# Patient Record
Sex: FEMALE | Race: WHITE | NOT HISPANIC OR LATINO | Employment: OTHER | URBAN - METROPOLITAN AREA
[De-identification: names, ages, dates, MRNs, and addresses within clinical notes are randomized per-mention and may not be internally consistent; named-entity substitution may affect disease eponyms.]

---

## 2024-01-20 ENCOUNTER — OFFICE VISIT (OUTPATIENT)
Age: 64
End: 2024-01-20
Payer: COMMERCIAL

## 2024-01-20 VITALS
HEART RATE: 69 BPM | DIASTOLIC BLOOD PRESSURE: 60 MMHG | OXYGEN SATURATION: 97 % | HEIGHT: 64 IN | TEMPERATURE: 97 F | RESPIRATION RATE: 18 BRPM | BODY MASS INDEX: 25.27 KG/M2 | WEIGHT: 148 LBS | SYSTOLIC BLOOD PRESSURE: 110 MMHG

## 2024-01-20 DIAGNOSIS — K64.9 HEMORRHOIDS, UNSPECIFIED HEMORRHOID TYPE: Primary | ICD-10-CM

## 2024-01-20 PROCEDURE — 99203 OFFICE O/P NEW LOW 30 MIN: CPT | Performed by: EMERGENCY MEDICINE

## 2024-01-20 RX ORDER — METOPROLOL SUCCINATE 50 MG/1
50 TABLET, EXTENDED RELEASE ORAL DAILY
COMMUNITY
Start: 2023-08-10 | End: 2024-08-09

## 2024-01-20 RX ORDER — CHOLECALCIFEROL (VITAMIN D3) 10 MCG (400 UNIT) TABLET
400
COMMUNITY

## 2024-01-20 RX ORDER — VALSARTAN AND HYDROCHLOROTHIAZIDE 80; 12.5 MG/1; MG/1
1 TABLET, FILM COATED ORAL DAILY
COMMUNITY
Start: 2023-08-10

## 2024-01-20 RX ORDER — HYDROCORTISONE ACETATE 25 MG/1
25 SUPPOSITORY RECTAL 2 TIMES DAILY
Qty: 12 SUPPOSITORY | Refills: 0 | Status: SHIPPED | OUTPATIENT
Start: 2024-01-20

## 2024-01-20 RX ORDER — FLECAINIDE ACETATE 50 MG/1
50 TABLET ORAL 2 TIMES DAILY
COMMUNITY

## 2024-01-20 RX ORDER — ATORVASTATIN CALCIUM 40 MG/1
40 TABLET, FILM COATED ORAL DAILY
COMMUNITY
Start: 2023-08-10

## 2024-01-20 NOTE — PROGRESS NOTES
[facility]        NAME: Naomie Skinner is a 63 y.o. female  : 1960    MRN: [id number]  DATE: 2024  TIME: 2:18 PM    Assessment and Plan   Hemorrhoids, unspecified hemorrhoid type [K64.9]  1. Hemorrhoids, unspecified hemorrhoid type  hydrocortisone (ANUSOL-HC) 25 mg suppository    Ambulatory Referral to Colorectal Surgery            Patient Instructions     Patient Instructions   Hemorrhoids   WHAT YOU NEED TO KNOW:   Hemorrhoids are swollen blood vessels inside your rectum (internal hemorrhoids) or on your anus (external hemorrhoids). Sometimes a hemorrhoid may prolapse. This means it extends out of your anus.  DISCHARGE INSTRUCTIONS:   Return to the emergency department if:   You have severe pain in your rectum or around your anus.    You have severe pain in your abdomen and you are vomiting.     You have bleeding from your anus that soaks through your underwear.    Contact your healthcare provider if:   You have frequent and painful bowel movements.    Your hemorrhoid looks or feels more swollen than usual.     You do not have a bowel movement for 2 days or more.     You see or feel tissue coming through your anus.     You have questions or concerns about your condition or care.    Medicines:  You may  need any of the following:  A pad, cream, or ointment  can help decrease pain, swelling, and itching.     Stool softeners  help treat or prevent constipation.     NSAIDs , such as ibuprofen, help decrease swelling, pain, and fever. NSAIDs can cause stomach bleeding or kidney problems in certain people. If you take blood thinner medicine, always ask your healthcare provider if NSAIDs are safe for you. Always read the medicine label and follow directions.    Take your medicine as directed.  Contact your healthcare provider if you think your medicine is not helping or if you have side effects. Tell your provider if you are allergic to any medicine. Keep a list of the medicines, vitamins,  and herbs you take. Include the amounts, and when and why you take them. Bring the list or the pill bottles to follow-up visits. Carry your medicine list with you in case of an emergency.    Manage your symptoms:   Apply ice on your anus for 15 to 20 minutes every hour or as directed.  Use an ice pack, or put crushed ice in a plastic bag. Cover it with a towel before you apply it to your anus. Ice helps prevent tissue damage and decreases swelling and pain.    Take a sitz bath.  Fill a bathtub with 4 to 6 inches of warm water. You may also use a sitz bath pan that fits inside a toilet bowl. Sit in the sitz bath for 15 minutes. Do this 3 times a day, and after each bowel movement. The warm water can help decrease pain and swelling.     Keep your anal area clean.  Gently wash the area with warm water daily. Soap may irritate the area. After a bowel movement, wipe with moist towelettes or wet toilet paper. Dry toilet paper can irritate the area.    Prevent hemorrhoids:   Do not strain to have a bowel movement.  Do not sit on the toilet too long. These actions can increase pressure on the tissues in your rectum and anus.     Drink plenty of liquids.  Liquids can help prevent constipation. Ask how much liquid to drink each day and which liquids are best for you.     Eat a variety of high-fiber foods.  Examples include fruits, vegetables, and whole grains. Ask your healthcare provider how much fiber you need each day. You may need to take a fiber supplement.         Exercise as directed.  Exercise, such as walking, may make it easier to have a bowel movement. Ask your healthcare provider to help you create an exercise plan.     Do not have anal sex.  Anal sex can weaken the skin around your rectum and anus.     Avoid heavy lifting.  This can cause straining and increase your risk for another hemorrhoid.    Follow up with your doctor as directed:  Write down your questions so you remember to ask them during your visits.  ©  Copyright [employer] 2023 Information is for End User's use only and may not be sold, redistributed or otherwise used for commercial purposes.  The above information is an  only. It is not intended as medical advice for individual conditions or treatments. Talk to your doctor, nurse or pharmacist before following any medical regimen to see if it is safe and effective for you.      Follow up with PCP in 3-5 days.  Proceed to  ER if symptoms worsen.    Chief Complaint     Chief Complaint   Patient presents with   • Hemorrhoids     Patient started to get hemorrhoids and does have a hx of it. Patient did use hydrocrtisona CV 25 Supp.          History of Present Illness       Patient complains of small amount of bright red blood per rectum with bowel movements associated with mild pa.  In since yesterday.  Long history of internal hemorrhoids with identical symptoms.  She has used Anusol HC suppositories in the past with symptomatic relief but she ran out    Review of Systems   Review of Systems   Constitutional:  Negative for chills, fatigue and fever.   HENT:  Negative for congestion, trouble swallowing and voice change.    Respiratory:  Negative for cough, chest tightness, shortness of breath and wheezing.    Cardiovascular:  Negative for chest pain.   Gastrointestinal:  Positive for blood in stool and rectal pain. Negative for abdominal distention, abdominal pain, anal bleeding, diarrhea, nausea and vomiting.   Genitourinary:  Negative for dysuria.   Musculoskeletal:  Negative for neck stiffness.   Skin:  Negative for rash.   Hematological:  Negative for adenopathy.       Current Medications       Current Outpatient Medications:   •  hydrocortisone (ANUSOL-HC) 25 mg suppository, Insert 1 suppository (25 mg total) into the rectum 2 (two) times a day, Disp: 12 suppository, Rfl: 0    Current Allergies     Allergies as of 01/20/2024   • (No Known Allergies)            The following portions of the patient's  "history were reviewed and updated as appropriate: allergies, current medications, past family history, past medical history, past social history, past surgical history and problem list.     No past medical history on file.    Past Surgical History:   Procedure Laterality Date   • EYE SURGERY Right    • REPLACEMENT TOTAL KNEE Right        No family history on file.      Medications have been verified.        Objective   /60   Pulse 69   Temp (!) 97 °F (36.1 °C) (Tympanic)   Resp 18   Ht 5' 4\" (1.626 m)   Wt 67.1 kg (148 lb)   SpO2 97%   BMI 25.40 kg/m²        Physical Exam     Physical Exam  Vitals and nursing note reviewed.   Constitutional:       General: She is not in acute distress.     Appearance: She is well-developed. She is not ill-appearing or toxic-appearing.   HENT:      Head: Normocephalic and atraumatic.      Nose: Mucosal edema present.      Mouth/Throat:      Pharynx: No oropharyngeal exudate or posterior oropharyngeal erythema.      Tonsils: No tonsillar abscesses.   Cardiovascular:      Rate and Rhythm: Normal rate and regular rhythm.   Musculoskeletal:      Cervical back: Neck supple.   Skin:     General: Skin is warm and dry.      Coloration: Skin is not pale.      Findings: No rash.   Neurological:      Mental Status: She is alert and oriented to person, place, and time.   Psychiatric:         Mood and Affect: Mood normal.         Behavior: Behavior normal.         Thought Content: Thought content normal.         Judgment: Judgment normal.               "

## 2024-01-20 NOTE — PATIENT INSTRUCTIONS
Hemorrhoids   WHAT YOU NEED TO KNOW:   Hemorrhoids are swollen blood vessels inside your rectum (internal hemorrhoids) or on your anus (external hemorrhoids). Sometimes a hemorrhoid may prolapse. This means it extends out of your anus.  DISCHARGE INSTRUCTIONS:   Return to the emergency department if:   You have severe pain in your rectum or around your anus.    You have severe pain in your abdomen and you are vomiting.     You have bleeding from your anus that soaks through your underwear.    Contact your healthcare provider if:   You have frequent and painful bowel movements.    Your hemorrhoid looks or feels more swollen than usual.     You do not have a bowel movement for 2 days or more.     You see or feel tissue coming through your anus.     You have questions or concerns about your condition or care.    Medicines:  You may  need any of the following:  A pad, cream, or ointment  can help decrease pain, swelling, and itching.     Stool softeners  help treat or prevent constipation.     NSAIDs , such as ibuprofen, help decrease swelling, pain, and fever. NSAIDs can cause stomach bleeding or kidney problems in certain people. If you take blood thinner medicine, always ask your healthcare provider if NSAIDs are safe for you. Always read the medicine label and follow directions.    Take your medicine as directed.  Contact your healthcare provider if you think your medicine is not helping or if you have side effects. Tell your provider if you are allergic to any medicine. Keep a list of the medicines, vitamins, and herbs you take. Include the amounts, and when and why you take them. Bring the list or the pill bottles to follow-up visits. Carry your medicine list with you in case of an emergency.    Manage your symptoms:   Apply ice on your anus for 15 to 20 minutes every hour or as directed.  Use an ice pack, or put crushed ice in a plastic bag. Cover it with a towel before you apply it to your anus. Ice helps  prevent tissue damage and decreases swelling and pain.    Take a sitz bath.  Fill a bathtub with 4 to 6 inches of warm water. You may also use a sitz bath pan that fits inside a toilet bowl. Sit in the sitz bath for 15 minutes. Do this 3 times a day, and after each bowel movement. The warm water can help decrease pain and swelling.     Keep your anal area clean.  Gently wash the area with warm water daily. Soap may irritate the area. After a bowel movement, wipe with moist towelettes or wet toilet paper. Dry toilet paper can irritate the area.    Prevent hemorrhoids:   Do not strain to have a bowel movement.  Do not sit on the toilet too long. These actions can increase pressure on the tissues in your rectum and anus.     Drink plenty of liquids.  Liquids can help prevent constipation. Ask how much liquid to drink each day and which liquids are best for you.     Eat a variety of high-fiber foods.  Examples include fruits, vegetables, and whole grains. Ask your healthcare provider how much fiber you need each day. You may need to take a fiber supplement.         Exercise as directed.  Exercise, such as walking, may make it easier to have a bowel movement. Ask your healthcare provider to help you create an exercise plan.     Do not have anal sex.  Anal sex can weaken the skin around your rectum and anus.     Avoid heavy lifting.  This can cause straining and increase your risk for another hemorrhoid.    Follow up with your doctor as directed:  Write down your questions so you remember to ask them during your visits.  © Copyright [employer] 2023 Information is for End User's use only and may not be sold, redistributed or otherwise used for commercial purposes.  The above information is an  only. It is not intended as medical advice for individual conditions or treatments. Talk to your doctor, nurse or pharmacist before following any medical regimen to see if it is safe and effective for you.

## 2024-02-12 ENCOUNTER — OFFICE VISIT (OUTPATIENT)
Age: 64
End: 2024-02-12
Payer: COMMERCIAL

## 2024-02-12 VITALS
DIASTOLIC BLOOD PRESSURE: 74 MMHG | SYSTOLIC BLOOD PRESSURE: 130 MMHG | OXYGEN SATURATION: 99 % | HEART RATE: 61 BPM | TEMPERATURE: 97 F | WEIGHT: 148 LBS | RESPIRATION RATE: 16 BRPM | BODY MASS INDEX: 25.27 KG/M2 | HEIGHT: 64 IN

## 2024-02-12 DIAGNOSIS — J20.8 ACUTE BACTERIAL BRONCHITIS: Primary | ICD-10-CM

## 2024-02-12 DIAGNOSIS — B96.89 ACUTE BACTERIAL BRONCHITIS: Primary | ICD-10-CM

## 2024-02-12 PROCEDURE — 99213 OFFICE O/P EST LOW 20 MIN: CPT | Performed by: EMERGENCY MEDICINE

## 2024-02-12 RX ORDER — PREDNISONE 10 MG/1
TABLET ORAL
Qty: 27 TABLET | Refills: 0 | Status: SHIPPED | OUTPATIENT
Start: 2024-02-12

## 2024-02-12 RX ORDER — BENZONATATE 200 MG/1
200 CAPSULE ORAL
Qty: 12 CAPSULE | Refills: 0 | Status: SHIPPED | OUTPATIENT
Start: 2024-02-12

## 2024-02-12 RX ORDER — AZITHROMYCIN 250 MG/1
TABLET, FILM COATED ORAL
Qty: 6 TABLET | Refills: 0 | Status: SHIPPED | OUTPATIENT
Start: 2024-02-12 | End: 2024-02-16

## 2024-02-12 RX ORDER — ALBUTEROL SULFATE 90 UG/1
2 AEROSOL, METERED RESPIRATORY (INHALATION) EVERY 6 HOURS PRN
Qty: 8.5 G | Refills: 0 | Status: SHIPPED | OUTPATIENT
Start: 2024-02-12

## 2024-02-12 NOTE — PROGRESS NOTES
[facility]        NAME: Naomie Skinner is a 63 y.o. female  : 1960    MRN: [id number]  DATE: 2024  TIME: 2:05 PM    Assessment and Plan   Acute bacterial bronchitis [J20.8, B96.89]  1. Acute bacterial bronchitis  predniSONE 10 mg tablet    azithromycin (ZITHROMAX) 250 mg tablet    albuterol (ProAir HFA) 90 mcg/act inhaler    benzonatate (TESSALON) 200 MG capsule            Patient Instructions     Patient Instructions   Take an expectorant - guaifenesin should be the only ingredient - during the day, and the cough suppressant (ex. Robitussin DM or Tessalon) if needed at night only.   Take Zinc 25 mg every 12 hours for the next week (the dose is important so do not just take a multivitamin with zinc or an over-the-counter cold med with zinc such as Airborne or Zicam, as that will not give you the sufficient dose).    You should also take vitamin D3 5000 i.u.s per day for the next 1 week, and vitamin-C 1 g twice daily (and again dosages are important, do not think you are getting enough vitamin C just by drinking extra orange juice).    If you are diabetic you should adhere strictly to your diabetic diet and monitor blood sugar closely while on prednisone and you should discontinue the prednisone if blood sugar becomes significantly elevated.  Avoid nonsteroidal anti-inflammatories like Advil or Aleve while on prednisone.   You may use Flonase as discussed.  You may also take a decongestant like Sudafed, unless you have hypertension or cardiac disease. Avoid nasal sprays like Afrin or other vasoconstrictors.    Acute Bronchitis   WHAT YOU NEED TO KNOW:   Acute bronchitis is swelling and irritation in your lungs. It is usually caused by a virus and most often happens in the winter. Bronchitis may also be caused by bacteria or by a chemical irritant, such as smoke.  DISCHARGE INSTRUCTIONS:   Return to the emergency department if:   You cough up blood.    Your lips or fingernails turn  blue.    You feel like you are not getting enough air when you breathe.    Call your doctor if:   Your symptoms do not go away or get worse, even after treatment.    Your cough does not get better within 4 weeks.    You have questions or concerns about your condition or care.    Medicines:  You may need any of the following:  Cough suppressants  decrease your urge to cough.    Decongestants  help loosen mucus in your lungs and make it easier to cough up. This can help you breathe easier.    Inhalers  may be given. Your healthcare provider may give you one or more inhalers to help you breathe easier and cough less. An inhaler gives you medicine to open your airways. Ask your healthcare provider to show you how to use your inhaler correctly.         Antiviral medicine  treats infections caused by a virus.    Antibiotics  may be given if your bronchitis is caused by bacteria or if you have lung condition.    Acetaminophen  decreases pain and fever. It is available without a doctor's order. Ask how much to take and how often to take it. Follow directions. Read the labels of all other medicines you are using to see if they also contain acetaminophen, or ask your doctor or pharmacist. Acetaminophen can cause liver damage if not taken correctly.    NSAIDs  help decrease swelling and pain or fever. This medicine is available with or without a doctor's order. NSAIDs can cause stomach bleeding or kidney problems in certain people. If you take blood thinner medicine, always ask your healthcare provider if NSAIDs are safe for you. Always read the medicine label and follow directions.    Self-care:   Drink liquids as directed.  You may need to drink more liquids than usual to stay hydrated. Ask how much liquid to drink each day and which liquids are best for you.    Use a cool mist humidifier.  This increases air moisture in your home. This may make it easier for you to breathe and help decrease your cough.     Get more rest.   Rest helps your body to heal. Slowly start to do more each day. Rest when you feel it is needed.    Prevent acute bronchitis:       Ask about vaccines you may need.  Get a flu vaccine each year as soon as recommended, usually in September or October. Ask your healthcare provider if you should also get a pneumonia or COVID-19 vaccine. Your healthcare provider can tell you if you should also get other vaccines, and when to get them.    Prevent the spread of germs.  You can decrease your risk for acute bronchitis and other illnesses by doing the following:     Wash your hands often with soap and water. Carry germ-killing hand lotion or gel with you. You can use the lotion or gel to clean your hands when soap and water are not available.         Do not touch your eyes, nose, or mouth unless you have washed your hands first.    Always cover your mouth when you cough to prevent the spread of germs. It is best to cough into a tissue or your shirt sleeve instead of into your hand. Ask those around you to cover their mouths when they cough.    Try to avoid people who have a cold or the flu. If you are sick, stay away from others as much as possible.    Avoid irritants in the air.  Avoid chemicals, fumes, and dust. Wear a face mask if you must work around dust or fumes. Stay inside on days when air pollution levels are high. If you have allergies, stay inside when pollen counts are high. Do not use aerosol products, such as spray-on deodorant, bug spray, and hair spray.    Do not smoke or be around others who are smoking.  Nicotine and other chemicals in cigarettes and cigars can cause lung damage. Ask your healthcare provider for information if you currently smoke and need help to quit. E-cigarettes or smokeless tobacco still contain nicotine. Talk to your healthcare provider before you use these products.  Follow up with your doctor as directed:  Write down questions you have so you will remember to ask them during your  follow-up visits.  © Copyright [employer] 2023 Information is for End User's use only and may not be sold, redistributed or otherwise used for commercial purposes.  The above information is an  only. It is not intended as medical advice for individual conditions or treatments. Talk to your doctor, nurse or pharmacist before following any medical regimen to see if it is safe and effective for you.      Follow up with PCP in 3-5 days.  Proceed to  ER if symptoms worsen.    Chief Complaint     Chief Complaint   Patient presents with    Cough     For 1.5 weeks. Taking promethazine that she had left over from a script last year. Cough is occasionally loose and productive. She states that she was wheezing yesterday.          History of Present Illness       Patient complains of cough and congestion for past 10 days now with worsening productive cough over the past few days associated with chest tightness and wheezing.    Cough  Associated symptoms include rhinorrhea, a sore throat and wheezing. Pertinent negatives include no chest pain, chills, fever, myalgias or shortness of breath.       Review of Systems   Review of Systems   Constitutional:  Negative for appetite change, chills, fatigue and fever.   HENT:  Positive for congestion, rhinorrhea, sinus pressure and sore throat. Negative for trouble swallowing and voice change.    Respiratory:  Positive for cough, chest tightness and wheezing. Negative for shortness of breath.    Cardiovascular:  Negative for chest pain.   Gastrointestinal:  Negative for nausea.   Musculoskeletal:  Negative for myalgias.         Current Medications       Current Outpatient Medications:     albuterol (ProAir HFA) 90 mcg/act inhaler, Inhale 2 puffs every 6 (six) hours as needed for shortness of breath May dispense 1 spacer, Disp: 8.5 g, Rfl: 0    apixaban (ELIQUIS) 5 mg, Take 5 mg by mouth 2 (two) times a day, Disp: , Rfl:     atorvastatin (LIPITOR) 40 mg tablet, Take 40 mg by  "mouth daily, Disp: , Rfl:     azithromycin (ZITHROMAX) 250 mg tablet, Take 2 tablets today then 1 tablet daily x 4 days, Disp: 6 tablet, Rfl: 0    benzonatate (TESSALON) 200 MG capsule, Take 1 capsule (200 mg total) by mouth daily at bedtime as needed for cough, Disp: 12 capsule, Rfl: 0    Cholecalciferol (Delta D3) 10 MCG (400 UNIT) TABS, Take 400 Units by mouth, Disp: , Rfl:     hydrocortisone (ANUSOL-HC) 25 mg suppository, Insert 1 suppository (25 mg total) into the rectum 2 (two) times a day, Disp: 12 suppository, Rfl: 0    metoprolol succinate (TOPROL-XL) 50 mg 24 hr tablet, Take 50 mg by mouth daily, Disp: , Rfl:     predniSONE 10 mg tablet, Take once daily all days pills on this schedule 6- 6- 5- 4- 3- 2- 1, Disp: 27 tablet, Rfl: 0    valsartan-hydrochlorothiazide (DIOVAN-HCT) 80-12.5 MG per tablet, Take 1 tablet by mouth daily, Disp: , Rfl:     flecainide (TAMBOCOR) 50 mg tablet, Take 50 mg by mouth 2 (two) times a day (Patient not taking: Reported on 2/12/2024), Disp: , Rfl:     Current Allergies     Allergies as of 02/12/2024    (No Known Allergies)            The following portions of the patient's history were reviewed and updated as appropriate: allergies, current medications, past family history, past medical history, past social history, past surgical history and problem list.     Past Medical History:   Diagnosis Date    Atrial fibrillation (HCC)     Hypertension        Past Surgical History:   Procedure Laterality Date    EYE SURGERY Right     REPLACEMENT TOTAL KNEE Right        Family History   Problem Relation Age of Onset    Heart disease Mother     No Known Problems Father     Cancer Brother         pancreatic         Medications have been verified.        Objective   /74   Pulse 61   Temp (!) 97 °F (36.1 °C)   Resp 16   Ht 5' 4\" (1.626 m)   Wt 67.1 kg (148 lb)   SpO2 99%   BMI 25.40 kg/m²        Physical Exam     Physical Exam  Vitals and nursing note reviewed.   Constitutional:  "      General: She is not in acute distress.     Appearance: She is well-developed. She is not ill-appearing or toxic-appearing.   HENT:      Head: Normocephalic and atraumatic.      Right Ear: External ear normal.      Left Ear: External ear normal.      Nose: Mucosal edema and congestion present.      Mouth/Throat:      Pharynx: Posterior oropharyngeal erythema present. No oropharyngeal exudate.      Tonsils: No tonsillar abscesses.   Cardiovascular:      Rate and Rhythm: Normal rate and regular rhythm.   Pulmonary:      Effort: Pulmonary effort is normal. No respiratory distress.      Breath sounds: Rhonchi present. No wheezing or rales.   Musculoskeletal:      Cervical back: Neck supple.   Skin:     General: Skin is warm and dry.   Neurological:      Mental Status: She is alert and oriented to person, place, and time.   Psychiatric:         Mood and Affect: Mood normal.         Behavior: Behavior normal.         Thought Content: Thought content normal.         Judgment: Judgment normal.

## 2024-02-12 NOTE — PATIENT INSTRUCTIONS
Take an expectorant - guaifenesin should be the only ingredient - during the day, and the cough suppressant (ex. Robitussin DM or Tessalon) if needed at night only.   Take Zinc 25 mg every 12 hours for the next week (the dose is important so do not just take a multivitamin with zinc or an over-the-counter cold med with zinc such as Airborne or Zicam, as that will not give you the sufficient dose).    You should also take vitamin D3 5000 i.u.s per day for the next 1 week, and vitamin-C 1 g twice daily (and again dosages are important, do not think you are getting enough vitamin C just by drinking extra orange juice).    If you are diabetic you should adhere strictly to your diabetic diet and monitor blood sugar closely while on prednisone and you should discontinue the prednisone if blood sugar becomes significantly elevated.  Avoid nonsteroidal anti-inflammatories like Advil or Aleve while on prednisone.   You may use Flonase as discussed.  You may also take a decongestant like Sudafed, unless you have hypertension or cardiac disease. Avoid nasal sprays like Afrin or other vasoconstrictors.    Acute Bronchitis   WHAT YOU NEED TO KNOW:   Acute bronchitis is swelling and irritation in your lungs. It is usually caused by a virus and most often happens in the winter. Bronchitis may also be caused by bacteria or by a chemical irritant, such as smoke.  DISCHARGE INSTRUCTIONS:   Return to the emergency department if:   You cough up blood.    Your lips or fingernails turn blue.    You feel like you are not getting enough air when you breathe.    Call your doctor if:   Your symptoms do not go away or get worse, even after treatment.    Your cough does not get better within 4 weeks.    You have questions or concerns about your condition or care.    Medicines:  You may need any of the following:  Cough suppressants  decrease your urge to cough.    Decongestants  help loosen mucus in your lungs and make it easier to cough up.  This can help you breathe easier.    Inhalers  may be given. Your healthcare provider may give you one or more inhalers to help you breathe easier and cough less. An inhaler gives you medicine to open your airways. Ask your healthcare provider to show you how to use your inhaler correctly.         Antiviral medicine  treats infections caused by a virus.    Antibiotics  may be given if your bronchitis is caused by bacteria or if you have lung condition.    Acetaminophen  decreases pain and fever. It is available without a doctor's order. Ask how much to take and how often to take it. Follow directions. Read the labels of all other medicines you are using to see if they also contain acetaminophen, or ask your doctor or pharmacist. Acetaminophen can cause liver damage if not taken correctly.    NSAIDs  help decrease swelling and pain or fever. This medicine is available with or without a doctor's order. NSAIDs can cause stomach bleeding or kidney problems in certain people. If you take blood thinner medicine, always ask your healthcare provider if NSAIDs are safe for you. Always read the medicine label and follow directions.    Self-care:   Drink liquids as directed.  You may need to drink more liquids than usual to stay hydrated. Ask how much liquid to drink each day and which liquids are best for you.    Use a cool mist humidifier.  This increases air moisture in your home. This may make it easier for you to breathe and help decrease your cough.     Get more rest.  Rest helps your body to heal. Slowly start to do more each day. Rest when you feel it is needed.    Prevent acute bronchitis:       Ask about vaccines you may need.  Get a flu vaccine each year as soon as recommended, usually in September or October. Ask your healthcare provider if you should also get a pneumonia or COVID-19 vaccine. Your healthcare provider can tell you if you should also get other vaccines, and when to get them.    Prevent the spread of  germs.  You can decrease your risk for acute bronchitis and other illnesses by doing the following:     Wash your hands often with soap and water. Carry germ-killing hand lotion or gel with you. You can use the lotion or gel to clean your hands when soap and water are not available.         Do not touch your eyes, nose, or mouth unless you have washed your hands first.    Always cover your mouth when you cough to prevent the spread of germs. It is best to cough into a tissue or your shirt sleeve instead of into your hand. Ask those around you to cover their mouths when they cough.    Try to avoid people who have a cold or the flu. If you are sick, stay away from others as much as possible.    Avoid irritants in the air.  Avoid chemicals, fumes, and dust. Wear a face mask if you must work around dust or fumes. Stay inside on days when air pollution levels are high. If you have allergies, stay inside when pollen counts are high. Do not use aerosol products, such as spray-on deodorant, bug spray, and hair spray.    Do not smoke or be around others who are smoking.  Nicotine and other chemicals in cigarettes and cigars can cause lung damage. Ask your healthcare provider for information if you currently smoke and need help to quit. E-cigarettes or smokeless tobacco still contain nicotine. Talk to your healthcare provider before you use these products.  Follow up with your doctor as directed:  Write down questions you have so you will remember to ask them during your follow-up visits.  © Copyright [employer] 2023 Information is for End User's use only and may not be sold, redistributed or otherwise used for commercial purposes.  The above information is an  only. It is not intended as medical advice for individual conditions or treatments. Talk to your doctor, nurse or pharmacist before following any medical regimen to see if it is safe and effective for you.